# Patient Record
Sex: FEMALE | Race: WHITE | URBAN - METROPOLITAN AREA
[De-identification: names, ages, dates, MRNs, and addresses within clinical notes are randomized per-mention and may not be internally consistent; named-entity substitution may affect disease eponyms.]

---

## 2019-11-08 ENCOUNTER — HOSPITAL ENCOUNTER (OUTPATIENT)
Dept: GENERAL RADIOLOGY | Facility: HOSPITAL | Age: 34
Discharge: HOME | End: 2019-11-08

## 2020-02-11 ENCOUNTER — HOSPITAL ENCOUNTER (OUTPATIENT)
Dept: URGENT CARE | Facility: CLINIC | Age: 35
Discharge: HOME | End: 2020-02-11
Attending: EMERGENCY MEDICINE

## 2020-02-13 ENCOUNTER — OFFICE VISIT CONVERTED (OUTPATIENT)
Dept: UROLOGY | Facility: CLINIC | Age: 35
End: 2020-02-13
Attending: UROLOGY

## 2020-05-18 ENCOUNTER — TELEMEDICINE CONVERTED (OUTPATIENT)
Dept: UROLOGY | Facility: CLINIC | Age: 35
End: 2020-05-18
Attending: UROLOGY

## 2021-05-13 NOTE — PROGRESS NOTES
Progress Note      Patient Name: Scarlett Sierra   Patient ID: [id number]   Sex: Female   YOB: 1985    Primary Care Provider: Chelsi LILLY   Referring Provider: Chelsi LILLY    Visit Date: May 18, 2020    Provider: Tangela Gusman MD   [facility]   Location Address: [address] [place] [address]  [id number]   Location Phone: [phone]          History Of Present Illness  She is a 34 year old female , who presents for follow up from Chelsi LILLY , for the treatment of symptoms consistent with recurrent cystitis.   The patient also complains of pain. The pain is maximal in the lower abdomen and suprapubic region and is described as aching and pressure-like in quality. It does not radiate. The aggravating factors are sexual activity and drinking caffeinated beverages and the alleviating factors are increasing fluids and warm baths.   She was evaluated by [facility] and she has had several tests including a vaginal ultrasound, a renal ultrasound and a VCUG. All of these were normal. The patient does not have a history of recurrent urinary tract infections.      All of her symptoms started after a particularly bad UTI in July.  She initially was treated with Bactrim for 3 days and her symptoms came back even worse after she finished it.  She was then treated with Macrobid for 7 days and again after she completed that her symptoms returned.  She then had a longer course of Macrobid.  Since then she states she has had intermittent episodes of burning that will last for a day or 2 and then get better.  Oftentimes she can put off her symptoms just by drinking water.  She is not had any other courses of antibiotics since then but she is just had bladder irritation that has not completely gone away since then.    She is had a very large work-up including a VCUG, renal ultrasound, vaginal ultrasound.  She did have an IUD placed for some  thickened endometrium.    Her vaginal cultures have been negative.  Her urine has looked good since that initial infection.   Video Conferencing Visit  Scarlett Sierra is a 34 year old female who is presenting for evaluation via video conferencing. Verbal consent obtained before beginning visit.   The following staff were present during this visit: Mary Candelaria MA       Past Medical History  Bladder Disorder; Cystitis         Allergy List  naproxen       Allergies Reconciled  Family Medical History  Family history of breast cancer         Social History  Tobacco (Former)         Review of Systems  · Constitutional  o Denies  o : fatigue, fever  · Gastrointestinal  o Denies  o : nausea, vomiting      Physical Examination  · Constitutional  o Appearance  o : Well nourished, well developed patient in no acute distress. Ambulating without difficulty.  · Head and Face  o Head  o :   § Inspection  § : atraumatic, normocephalic  o Face  o :   § Inspection  § : no facial lesions  · Eyes  o Sclerae  o : sclerae white  · Ears, Nose, Mouth and Throat  o Ears  o :   § External Ears  § : appearance within normal limits, no lesions present  o Nose  o :   § External Nose  § : appearance normal  · Neck  o Inspection/Palpation  o : normal appearance, trachea midline  · Respiratory  o Respiratory Effort  o : breathing unlabored  · Skin and Subcutaneous Tissue  o General Inspection  o : No rashes, lesions or areas of discoloration present. Skin turgor is normal.  o General Palpation  o : No abnormalities, masses or tenderness on palpation.  · Neurologic  o Mental Status Examination  o :   § Orientation  § : grossly oriented to person, place and time  § Speech/Language  § : communication ability within normal limits  o Gait and Station  o : normal gait, able to stand without difficulty  · Psychiatric  o Judgement and Insight  o : judgment and insight intact, judgement for everyday activities and social situations within normal  limits, insight intact  o Mood and Affect  o : mood normal, affect appropriate              Assessment  · Cystitis, Chronic Interstitial     595.1  · Cystitis, Acute     595.0/N30.00      Plan  · Medications  o Macrobid 100 mg oral capsule   SIG: take 1 capsule by oral route PRN as directed   DISP: (30) capsules with 6 refills  Prescribed on 05/18/2020     o Medications have been Reconciled  o Transition of Care or Provider Policy  · Instructions  o Post-coital antibiotics prescribed. I will see her in 6 months. Continue IC diet modifications.   o FOLLOW-UP:  o In 6 months  · Referrals  o ID: [id number] Date: 02/12/2020 Type: Inbound  Specialty: Urology            Electronically Signed by: Amrita Berry-, -Author on May 27, 2020 04:20:08 PM  Electronically Co-signed by: Tangela Gusman MD -Reviewer on May 27, 2020 05:02:50 PM

## 2021-05-15 VITALS
DIASTOLIC BLOOD PRESSURE: 56 MMHG | WEIGHT: 144 LBS | SYSTOLIC BLOOD PRESSURE: 96 MMHG | HEIGHT: 64 IN | BODY MASS INDEX: 24.59 KG/M2